# Patient Record
Sex: MALE | Race: WHITE
[De-identification: names, ages, dates, MRNs, and addresses within clinical notes are randomized per-mention and may not be internally consistent; named-entity substitution may affect disease eponyms.]

---

## 2017-07-29 ENCOUNTER — HOSPITAL ENCOUNTER (INPATIENT)
Dept: HOSPITAL 74 - JER | Age: 82
LOS: 3 days | Discharge: HOME | DRG: 720 | End: 2017-08-01
Attending: NURSE PRACTITIONER | Admitting: INTERNAL MEDICINE
Payer: COMMERCIAL

## 2017-07-29 VITALS — BODY MASS INDEX: 26.6 KG/M2

## 2017-07-29 DIAGNOSIS — A41.9: Primary | ICD-10-CM

## 2017-07-29 DIAGNOSIS — N40.1: ICD-10-CM

## 2017-07-29 DIAGNOSIS — E87.1: ICD-10-CM

## 2017-07-29 DIAGNOSIS — N39.0: ICD-10-CM

## 2017-07-29 DIAGNOSIS — R33.8: ICD-10-CM

## 2017-07-29 DIAGNOSIS — B96.89: ICD-10-CM

## 2017-07-29 DIAGNOSIS — N17.9: ICD-10-CM

## 2017-07-29 DIAGNOSIS — E11.9: ICD-10-CM

## 2017-07-29 DIAGNOSIS — I10: ICD-10-CM

## 2017-07-29 LAB
ALBUMIN SERPL-MCNC: 3.6 G/DL (ref 3.4–5)
ALP SERPL-CCNC: 76 U/L (ref 45–117)
ALT SERPL-CCNC: 35 U/L (ref 12–78)
ANION GAP SERPL CALC-SCNC: 13 MMOL/L (ref 8–16)
ANION GAP SERPL CALC-SCNC: 9 MMOL/L (ref 8–16)
APPEARANCE UR: (no result)
AST SERPL-CCNC: 31 U/L (ref 15–37)
BACTERIA #/AREA URNS HPF: (no result) /HPF
BILIRUB SERPL-MCNC: 0.8 MG/DL (ref 0.2–1)
BILIRUB UR STRIP.AUTO-MCNC: NEGATIVE MG/DL
CALCIUM SERPL-MCNC: 8.6 MG/DL (ref 8.5–10.1)
CALCIUM SERPL-MCNC: 8.9 MG/DL (ref 8.5–10.1)
CO2 SERPL-SCNC: 26 MMOL/L (ref 21–32)
CO2 SERPL-SCNC: 26 MMOL/L (ref 21–32)
COLOR UR: YELLOW
CREAT SERPL-MCNC: 1.1 MG/DL (ref 0.7–1.3)
CREAT SERPL-MCNC: 1.4 MG/DL (ref 0.7–1.3)
DEPRECATED RDW RBC AUTO: 13.4 % (ref 11.9–15.9)
GLUCOSE SERPL-MCNC: 154 MG/DL (ref 74–106)
GLUCOSE SERPL-MCNC: 192 MG/DL (ref 74–106)
KETONES UR QL STRIP: NEGATIVE
LEUKOCYTE ESTERASE UR QL STRIP.AUTO: (no result)
MCH RBC QN AUTO: 30 PG (ref 25.7–33.7)
MCHC RBC AUTO-ENTMCNC: 33.4 G/DL (ref 32–35.9)
MCV RBC: 89.7 FL (ref 80–96)
METAMYELOCYTES NFR BLD: 2 % (ref 0–2)
MUCOUS THREADS URNS QL MICRO: (no result)
NEUTROPHILS # BLD: 82 % (ref 42.8–82.8)
NITRITE UR QL STRIP: NEGATIVE
PH UR: 5 [PH] (ref 5–8)
PLATELET # BLD AUTO: 205 K/MM3 (ref 134–434)
PLATELET # BLD EST: ADEQUATE 10*3/UL
PMV BLD: 8.3 FL (ref 7.5–11.1)
PROT SERPL-MCNC: 7.4 G/DL (ref 6.4–8.2)
PROT UR QL STRIP: (no result)
PROT UR QL STRIP: NEGATIVE
RBC # BLD AUTO: 2 /HPF (ref 0–3)
RBC # UR STRIP: (no result) /UL
SP GR UR: 1.02 (ref 1–1.02)
UROBILINOGEN UR STRIP-MCNC: NEGATIVE MG/DL (ref 0.2–1)
WBC # BLD AUTO: 20.7 K/MM3 (ref 4–10)
WBC # UR AUTO: 60 /HPF (ref 3–5)

## 2017-07-29 RX ADMIN — INSULIN ASPART SCH: 100 INJECTION, SOLUTION INTRAVENOUS; SUBCUTANEOUS at 22:19

## 2017-07-29 NOTE — PDOC
History of Present Illness





- General


Chief Complaint: Urinary Problem


Stated Complaint: URINE RETENTION


Time Seen by Provider: 07/29/17 12:52


History Source: Patient, Family





- History of Present Illness


Timing/Duration: reports: constant





Past History





- Past Medical History


Allergies/Adverse Reactions: 


 Allergies











Allergy/AdvReac Type Severity Reaction Status Date / Time


 


No Known Drug Allergies Allergy   Verified 07/29/17 12:46











Home Medications: 


Ambulatory Orders





Metformin HCl [Glucophage] 500 mg PO DAILY 02/17/15 


Metoprolol Tartrate 50 mg PO DAILY 02/17/15 


Omeprazole [Prilosec] 40 mg PO DAILY 02/17/15 


Tamsulosin HCl 0.4 mg PO DAILY 02/17/15 








Anemia: No


Asthma: No


Cancer: No


Cardiac Disorders: No


CVA: No


COPD: No


CHF: No


Dementia: No


Diabetes: Yes


GI Disorders: Yes (REFLUX)


 Disorders: No


HTN: Yes


Hypercholesterolemia: No


Liver Disease: No


Seizures: No


Thyroid Disease: No





- Surgical History


Abdominal Surgery: No


Appendectomy: No


Cardiac Surgery: No


Cholecystectomy: No


Lung Surgery: No


Orthopedic Surgery: No





- Psycho/Social/Smoking Cessation Hx


Suicidal Ideation: No


Smoking History: Never smoked


Have you smoked in the past 12 months: No


Hx Alcohol Use: No


Drug/Substance Use Hx: No


Substance Use Type: None


Hx Substance Use Treatment: No





**Review of Systems





- Review of Systems


Constitutional: No: Chills, Fever


ABD/GI: No: Nausea, Vomiting, Abdominal cramping


: No: Dysuria, Flank Pain, Hematuria





*Physical Exam





- Vital Signs


 Last Vital Signs











Temp Pulse Resp BP Pulse Ox


 


 99.0 F   116 H  20   156/67   96 


 


 07/29/17 12:41  07/29/17 12:41  07/29/17 12:41  07/29/17 12:41  07/29/17 12:41














- Physical Exam


General Appearance: Yes: Appropriately Dressed.  No: Apparent Distress


HEENT: positive: Normal Voice


Neck: positive: Supple


Respiratory/Chest: negative: Respiratory Distress


Gastrointestinal/Abdominal: positive: Soft.  negative: Tender, Distended


Musculoskeletal: negative: CVA Tenderness


Extremity: positive: Normal Inspection


Integumentary: positive: Dry, Warm


Neurologic: positive: Fully Oriented, Alert, Normal Mood/Affect





ED Treatment Course





- LABORATORY


CBC & Chemistry Diagram: 


 07/29/17 11:50





 07/29/17 11:50





Medical Decision Making





- Medical Decision Making


07/29/17 12:58


81 yo M, h/o HTN, DM, BPH on flomax, BIB daughter for urinary retention.  As 

per patient, for the past 2 days he has been able to urinate but only a small 

amount comes out and finds himself urinating more frequently.  No hematuria, 

flank pain, nausea, vomiting, fever or chills





see exam





Urinary retention


Possibly 2/2 known h/o BPH, r/o uti


-pollack insertion


-labs


-ua.cx


-discuss dispo w/ pt's urologist





07/29/17 13:18








07/29/17 13:55


100 cc UO w/ pollack placement.  Leukocytosis of 20 with leuk esterase, blood and 

proteins on UA.  Urine culture sent.  Will treat for UTI with a dose of 

Rocephin (no prior sensitivity on record) in ED and discuss disposition with 

patient's PMD and urologist 








07/29/17 14:31








07/29/17 17:34


Dr Garcia contacted at 2:30 and at this time, have not heard back from MD. 

Hospitalist contacted and pt admitted.





07/29/17 17:43








*DC/Admit/Observation/Transfer


Diagnosis at time of Disposition: 


 Urinary retention





UTI (urinary tract infection)


Qualifiers:


 Urinary tract infection type: acute cystitis Hematuria presence: without 

hematuria Qualified Code(s): N30.00 - Acute cystitis without hematuria





- Discharge Dispostion


Condition at time of disposition: Good


Admit: Yes





- Referrals


Referrals: 


Lewis Logan MD [Primary Care Provider] -

## 2017-07-29 NOTE — HP
Admitting History and Physical





- Admission


Chief Complaint: urine retention, dysuria


History of Present Illness: 


83 yo [place]ralt[place] speaking M, h/o HTN, DM, BPH on flomax, BIB daughter for urinary 

retention. Pt reports dysuira x 3 days. he report bilateral flank pain. he 

reports subj fevers, chills, and rigors. he reports bladder pressure. he 

reports decreased po intake for 3 days. he denies n/v/d, cough, body aches, 

sore throat. he denies sob, chest pain, tingling, edema. he denies syncope. he 

reports following dr vicki roque (urology).





pmh/psh-  HTN, DM, BPH 


social- denies alcohol, tobacco. lives with family


famhx- mom- cancer(unk)





pcp- dr granger


urology- dr vicki roque





ros neg except for hpi





physical


gen- in nad, alert


hent- at/nc, nohemi, neck supple, trachea mid-line, no lymphadenopathy


resp- no cough, lung ctab, no ronchi, no wheeze, no cyanosis


cards- s1s2 heard, tachy, normal rhythm, extremity pulses palpable, no leg edema


gi- soft non-tender, no guarding, no distention, mild ttp over bladder, no 

rigidity


- pollack in place, scant garry urine in bag, no cvat


skin-irregularly thickened lesion to lateral right buttocks with mild redness 

at base, no rashes 


psych- cooperative, no agitation


neuro- alert oriented, no facial droop, speech clear, cn2-12 grossly intact. 

intentional hand tremors


musk- no back pain, moving all ext spontaneously





prob list


sirs


dm


bph


htn


hyponatremia


uti


elevated cr


?urine retention





a/p- 83 yo [place] speaking M, h/o HTN, DM, BPH on flomax, BIB daughter for 

urinary retention found to have uti and admitted for eval of their emergent 

condition





1. uti, sirs


follow up cultures


ivf hydration


cont abx (ctx)


monitor labs





2. dm


ssi


f/s


diabetic diet


hold metformin 2/2 elevated cr





3. ?jignesh possibly related to ?sepsis, ?obstructive uropathy


fu renal us


no prior labs for comparison


follow up urine lytes


monitor labs





4. bph, ?urinary retention


fu urology consult


cont flomax


maintain pollack





5. hyponatremia probably volume depletion +/- hyperglycemia


pt reports decreased po intake x 3 days


does no look overloaded on exam


fu bmp





6. htn


cont home meds








dvt prophy


scd, oob, hep sq





fen


gentle fluids, diabetic diet





dispo- requires >2mn stay for uti, sepsis


History Source: Patient, Family Member


Limitations to Obtaining History: Language Barrier





- Smoking History


Smoking history: Never smoked


Have you smoked in the past 12 months: No





- Alcohol/Substance Use


Hx Alcohol Use: No





Home Medications





- Allergies


Allergies/Adverse Reactions: 


 Allergies











Allergy/AdvReac Type Severity Reaction Status Date / Time


 


No Known Drug Allergies Allergy   Verified 07/29/17 12:46














- Home Medications


Home Medications: 


Ambulatory Orders





Metformin HCl [Glucophage] 500 mg PO DAILY 02/17/15 


Metoprolol Tartrate 50 mg PO DAILY 02/17/15 


Omeprazole [Prilosec] 40 mg PO DAILY 02/17/15 


Tamsulosin HCl 0.4 mg PO DAILY 02/17/15 











Physical Examination


Vital Signs: 


 Vital Signs











Temperature  98 F   07/29/17 16:59


 


Pulse Rate  98 H  07/29/17 16:59


 


Respiratory Rate  18   07/29/17 16:59


 


Blood Pressure  140/68   07/29/17 16:59


 


O2 Sat by Pulse Oximetry (%)  96   07/29/17 16:59














Visit type





- Emergency Visit


Emergency Visit: Yes


ED Registration Date: 07/29/17


Care time: The patient presented to the Emergency Department on the above date 

and was hospitalized for further evaluation of their emergent condition.





- New Patient


This patient is new to me today: Yes


Date on this admission: 07/30/17





- Critical Care


Critical Care patient: No

## 2017-07-30 LAB
ANION GAP SERPL CALC-SCNC: 10 MMOL/L (ref 8–16)
BASOPHILS # BLD: 0.3 % (ref 0–2)
CALCIUM SERPL-MCNC: 8.2 MG/DL (ref 8.5–10.1)
CO2 SERPL-SCNC: 26 MMOL/L (ref 21–32)
CREAT SERPL-MCNC: 1 MG/DL (ref 0.7–1.3)
CREAT UR-MCNC: 117 MG/DL (ref 20–370)
DEPRECATED RDW RBC AUTO: 13.7 % (ref 11.9–15.9)
EOSINOPHIL # BLD: 0.1 % (ref 0–4.5)
GLUCOSE SERPL-MCNC: 104 MG/DL (ref 74–106)
MAGNESIUM SERPL-MCNC: 2 MG/DL (ref 1.8–2.4)
MCH RBC QN AUTO: 30.3 PG (ref 25.7–33.7)
MCHC RBC AUTO-ENTMCNC: 33.9 G/DL (ref 32–35.9)
MCV RBC: 89.4 FL (ref 80–96)
NEUTROPHILS # BLD: 78 % (ref 42.8–82.8)
PHOSPHATE SERPL-MCNC: 2.3 MG/DL (ref 2.5–4.9)
PLATELET # BLD AUTO: 184 K/MM3 (ref 134–434)
PMV BLD: 9 FL (ref 7.5–11.1)
WBC # BLD AUTO: 17.9 K/MM3 (ref 4–10)

## 2017-07-30 RX ADMIN — METOPROLOL SUCCINATE SCH MG: 50 TABLET, EXTENDED RELEASE ORAL at 10:33

## 2017-07-30 RX ADMIN — INSULIN ASPART SCH: 100 INJECTION, SOLUTION INTRAVENOUS; SUBCUTANEOUS at 11:53

## 2017-07-30 RX ADMIN — INSULIN ASPART SCH: 100 INJECTION, SOLUTION INTRAVENOUS; SUBCUTANEOUS at 17:56

## 2017-07-30 RX ADMIN — INSULIN ASPART SCH: 100 INJECTION, SOLUTION INTRAVENOUS; SUBCUTANEOUS at 21:47

## 2017-07-30 RX ADMIN — CEFTRIAXONE SCH MLS/HR: 1 INJECTION, POWDER, FOR SOLUTION INTRAMUSCULAR; INTRAVENOUS at 10:33

## 2017-07-30 RX ADMIN — HEPARIN SODIUM SCH UNIT: 5000 INJECTION, SOLUTION INTRAVENOUS; SUBCUTANEOUS at 10:33

## 2017-07-30 RX ADMIN — HEPARIN SODIUM SCH UNIT: 5000 INJECTION, SOLUTION INTRAVENOUS; SUBCUTANEOUS at 21:47

## 2017-07-30 RX ADMIN — TAMSULOSIN HYDROCHLORIDE SCH MG: 0.4 CAPSULE ORAL at 10:33

## 2017-07-30 RX ADMIN — INSULIN ASPART SCH: 100 INJECTION, SOLUTION INTRAVENOUS; SUBCUTANEOUS at 06:54

## 2017-07-30 RX ADMIN — PANTOPRAZOLE SODIUM SCH MG: 40 TABLET, DELAYED RELEASE ORAL at 10:33

## 2017-07-30 NOTE — PN
Physical Exam: 


SUBJECTIVE: Patient seen and examined at the bedside.  


Denies any pain, shortness of breath or suprapubic discomfort.





OBJECTIVE:


Lactic acid 1.1


WBC trending down to 18.9


Remains afebrile


Urine culture pending/blood cultures pending


UA: +1 protein, +2 blood, +2 leuk est.





 Vital Signs











 Period  Temp  Pulse  Resp  BP Sys/Castellano  Pulse Ox


 


 Last 24 Hr  97.7 F-97.8 F  92-98  20-20  125-126/62-63  96








GENERAL: The patient is awake, alert, and fully oriented, in no acute distress.


HEAD: Normal with no signs of trauma.


EYES: PERRL, extraocular movements intact, sclera anicteric, conjunctiva clear. 

No ptosis. 


ENT: Ears normal, nares patent, oropharynx clear without exudates, moist mucous 

membranes.


NECK: Trachea midline, full range of motion, supple. 


LUNGS:  Scattered expiratory wheezing, patient denies shortness of breath - IVF 

stopped


HEART: Regular rate and rhythm, S1, S2 without murmur


ABDOMEN: Soft, nontender, nondistended, normoactive bowel sounds, no guarding, 

no rebound, no hepatosplenomegaly, no masses.


EXTREMITIES: 2+ pulses, warm, well-perfused, no edema. 


NEUROLOGICAL:Normal speech, gait not observed.


PSYCH: Normal mood, normal affect.


SKIN: Warm, dry, normal turgor, no rashes or lesions noted


 Laboratory Results - last 24 hr











  07/29/17 07/29/17 07/29/17





  20:20 21:49 22:18


 


Sodium   132 L 


 


Potassium   4.0 


 


Chloride   97 L 


 


Carbon Dioxide   26 


 


Anion Gap   9 


 


BUN   18  D 


 


Creatinine   1.1  D 


 


POC Glucometer    152


 


Random Glucose   154 H 


 


Lactic Acid  1.1  


 


Calcium   8.6 


 


Phosphorus   


 


Magnesium   














  07/30/17 07/30/17





  06:20 06:53


 


Sodium  135 L 


 


Potassium  3.9 


 


Chloride  99 


 


Carbon Dioxide  26 


 


Anion Gap  10 


 


BUN  18 


 


Creatinine  1.0 


 


POC Glucometer   112


 


Random Glucose  104  D 


 


Lactic Acid  


 


Calcium  8.2 L 


 


Phosphorus  2.3 L 


 


Magnesium  2.0 








Active Medications











Generic Name Dose Route Start Last Admin





  Trade Name Freq  PRN Reason Stop Dose Admin


 


Acetaminophen  650 mg 07/29/17 20:03  





  Tylenol -  PO   





  Q4H PRN   





  FEVER OR PAIN   


 


Heparin Sodium (Porcine)  5,000 unit 07/30/17 10:00  





  Heparin -  SQ   





  BID VALENTINO   


 


Ceftriaxone Sodium  50 mls @ 100 mls/hr 07/30/17 10:00  





  Rocephin 1gm Ivpb (Pre-Docked)  IVPB   





  DAILY [facility]   


 


Sodium Chloride  1,000 mls @ 75 mls/hr 07/29/17 20:15 07/29/17 22:17





  Normal Saline -  IV   75 mls/hr





  ASDIR VALENTINO   Administration


 


Insulin Aspart  1 vial 07/29/17 22:00 07/30/17 06:54





  Novolog Vial Sliding Scale -  SQ   Not Given





  [facility]   





  Protocol   


 


Metoprolol Succinate  50 mg 07/30/17 10:00  





  Toprol Xl -  PO   





  DAILY VALENTINO   


 


Ondansetron HCl  4 mg 07/29/17 20:03  





  Zofran Injection  IVPB   





  Q6H PRN   





  NAUSEA   


 


Pantoprazole Sodium  40 mg 07/30/17 10:00  





  Protonix -  PO   





  DAILY VALENTINO   


 


Tamsulosin HCl  0.4 mg 07/30/17 10:00  





  Flomax -  PO   





  DAILY [facility]INO   











ASSESSMENT/PLAN:





Patient is a 82 year old male with a significant past medical history of 

hypertension, diabetes mellitus, and BPH.  He presented to the ED on 7/29/2017 

for urinary retention and was found to have UTI.  On admission his lactic acid 

remained normal at 1.1, but WBC @ 20.7 and he was tachycardic at 116.





Imaging:


Chest Xray 7/30/17: no acute pathology, clear lungs, normal sage, normal heart


Renal ultrasound 7/29/2017: negative exam





:


UTI/Sepsis:


A/P:  Elevated wbc on admission with tachycardia


WBC trending down, now with mild tachycardia in 90s


UA +protein, 2+ blood, cloudy urine, 2+leuk est.


On Ceftriaxone (7/29- )


UC pending, blood cultures pending


On Flomax


Urology consult





LIBERTY - resolved


Creatinine 1.4>1.1, d/c ivf





BPH


A/P:  On Flomax


[place]on catheter





Endocrine:


Diabetes Mellitus


A/P:  Diabetic diet, monitor BGMs


Novolog sliding scale





Cardiology:


Hypertension


A/P: On Metoprolol, monitor BP





F.E.N.


Fluids: tolerating PO, d/c fluids secondary to scattered wheezing


Electrolytes: , monitor





Prophylaxis:


DVT: Heparin 


Gi: Protonix








Disposition:  Requires inpatient hospitalization for acute condition.  Full 

code.

## 2017-07-30 NOTE — EKG
Test Reason : 

Blood Pressure : ***/*** mmHG

Vent. Rate : 097 BPM     Atrial Rate : 097 BPM

   P-R Int : 180 ms          QRS Dur : 086 ms

    QT Int : 334 ms       P-R-T Axes : 064 024 039 degrees

   QTc Int : 424 ms

 

NORMAL SINUS RHYTHM

POSSIBLE LEFT ATRIAL ENLARGEMENT

NONSPECIFIC ST ABNORMALITY

BORDERLINE ECG NO PREVIOUS ECGS AVAILABLE

Confirmed by BANDAR CARRENO MD ([id number]) on 7/30/2017 5:46:56 PM

 

Referred By:             Confirmed By:BANDAR CARRENO MD

## 2017-07-31 LAB
ALBUMIN SERPL-MCNC: 3.1 G/DL (ref 3.4–5)
ALP SERPL-CCNC: 90 U/L (ref 45–117)
ALT SERPL-CCNC: 40 U/L (ref 12–78)
ANION GAP SERPL CALC-SCNC: 10 MMOL/L (ref 8–16)
AST SERPL-CCNC: 30 U/L (ref 15–37)
BASOPHILS # BLD: 0.2 % (ref 0–2)
BILIRUB SERPL-MCNC: 0.8 MG/DL (ref 0.2–1)
CALCIUM SERPL-MCNC: 8.4 MG/DL (ref 8.5–10.1)
CO2 SERPL-SCNC: 26 MMOL/L (ref 21–32)
CREAT SERPL-MCNC: 0.9 MG/DL (ref 0.7–1.3)
DEPRECATED RDW RBC AUTO: 13.8 % (ref 11.9–15.9)
EOSINOPHIL # BLD: 1 % (ref 0–4.5)
GLUCOSE SERPL-MCNC: 126 MG/DL (ref 74–106)
MCH RBC QN AUTO: 29.8 PG (ref 25.7–33.7)
MCHC RBC AUTO-ENTMCNC: 33.6 G/DL (ref 32–35.9)
MCV RBC: 88.9 FL (ref 80–96)
NEUTROPHILS # BLD: 76.1 % (ref 42.8–82.8)
PLATELET # BLD AUTO: 217 K/MM3 (ref 134–434)
PMV BLD: 9 FL (ref 7.5–11.1)
PROT SERPL-MCNC: 6.6 G/DL (ref 6.4–8.2)
WBC # BLD AUTO: 15.4 K/MM3 (ref 4–10)

## 2017-07-31 RX ADMIN — HEPARIN SODIUM SCH UNIT: 5000 INJECTION, SOLUTION INTRAVENOUS; SUBCUTANEOUS at 10:02

## 2017-07-31 RX ADMIN — INSULIN ASPART SCH UNITS: 100 INJECTION, SOLUTION INTRAVENOUS; SUBCUTANEOUS at 11:42

## 2017-07-31 RX ADMIN — PANTOPRAZOLE SODIUM SCH MG: 40 TABLET, DELAYED RELEASE ORAL at 10:02

## 2017-07-31 RX ADMIN — METOPROLOL SUCCINATE SCH MG: 50 TABLET, EXTENDED RELEASE ORAL at 10:02

## 2017-07-31 RX ADMIN — CEFTRIAXONE SCH MLS/HR: 1 INJECTION, POWDER, FOR SOLUTION INTRAMUSCULAR; INTRAVENOUS at 10:03

## 2017-07-31 RX ADMIN — SULFAMETHOXAZOLE AND TRIMETHOPRIM SCH EACH: 800; 160 TABLET ORAL at 21:12

## 2017-07-31 RX ADMIN — INSULIN ASPART SCH: 100 INJECTION, SOLUTION INTRAVENOUS; SUBCUTANEOUS at 17:56

## 2017-07-31 RX ADMIN — INSULIN ASPART SCH: 100 INJECTION, SOLUTION INTRAVENOUS; SUBCUTANEOUS at 21:11

## 2017-07-31 RX ADMIN — HEPARIN SODIUM SCH UNIT: 5000 INJECTION, SOLUTION INTRAVENOUS; SUBCUTANEOUS at 21:11

## 2017-07-31 RX ADMIN — TAMSULOSIN HYDROCHLORIDE SCH MG: 0.4 CAPSULE ORAL at 10:03

## 2017-07-31 RX ADMIN — INSULIN ASPART SCH: 100 INJECTION, SOLUTION INTRAVENOUS; SUBCUTANEOUS at 06:57

## 2017-07-31 NOTE — CONSULT
Consult - text type





- Consultation


Consultation Note: 


CC: uti with retention





HPI:  Patient is an 82 year old male with history of DM, htn, and bph. Patient 

has been well controlled on flomax. Patient developed fevers, chill, with 

difficulty emptying his bladder.  The patient denies gross hematuria.  He did 

complain of bilateral flank pain without nausea or vomiting.  





PE


abd-bladder not palpable; no CVAT





genitalia-nl phallus/testes





rectal 2-3 + prostate with asymmetry; no fluctuance





renal sonogram and labs reviewed





imp


uti


bph


urinary retention





plan


continue antibiotics


continue flomax


d/c pollack and give prolonged trial of voiding





discussed with family x 25 minutes

## 2017-07-31 NOTE — PN
Physical Exam: 


SUBJECTIVE: Patient seen and examined.  States he feels well, denies any chest 

or shortness of breath.





OBJECTIVE:


Hyponatremia @134 on elderly patient  - NS 1 liters @100cc x 1 with Repeat 

sodium in a.m. 


pollack with clear yellow urine, d/c pollack catheter, start voiding trial - 

monitor intake and output


Started on Bactrim Ds (Sulfamethoxzole/Trimethoprim) tonight will need an 

additional 7 days on d/c





 Vital Signs











 Period  Temp  Pulse  Resp  BP Sys/Castellano  Pulse Ox


 


 Last 24 Hr  97.7 F-98.6 F    19-20  118-152/60-77  96-99








GENERAL: The patient is awake, alert, and fully oriented, in no acute distress.


HEAD: Normal with no signs of trauma.


EYES: PERRL, extraocular movements intact, sclera anicteric, conjunctiva clear. 

No ptosis. 


ENT: Ears normal, nares patent, oropharynx clear without exudates, moist mucous 

membranes.


NECK: Trachea midline, full range of motion, supple. 


LUNGS:  clear to auscultation bilaterally, no accessory muscle use.


HEART: Regular rate and rhythm, S1, S2 without murmur


ABDOMEN: Soft, nontender, nondistended, normoactive bowel sounds, no guarding, 

no rebound, no hepatosplenomegaly, no masses.


EXTREMITIES: 2+ pulses, warm, well-perfused, no edema. 


NEUROLOGICAL:Normal speech, gait not observed.


PSYCH: Normal mood, normal affect.


SKIN: Warm, dry, normal turgor, no rashes or lesions noted





 Laboratory Results - last 24 hr











  07/30/17 07/30/17 07/30/17





  06:20 08:50 08:59


 


WBC  17.9 H  


 


RBC  4.14  


 


Hgb  12.6  


 


Hct  37.0  


 


MCV  89.4  


 


MCH  30.3  


 


MCHC  33.9  


 


RDW  13.7  


 


Plt Count  184  


 


MPV  9.0  


 


Neutrophils %  78.0  


 


Lymphocytes %  8.9  


 


Monocytes %  12.7 H D  


 


Eosinophils %  0.1  


 


Basophils %  0.3  


 


Sodium   


 


Potassium   


 


Chloride   


 


Carbon Dioxide   


 


Anion Gap   


 


BUN   


 


Creatinine   


 


Creat Clearance w eGFR   


 


POC Glucometer   


 


Random Glucose   


 


Calcium   


 


Total Bilirubin   


 


AST   


 


ALT   


 


Alkaline Phosphatase   


 


Total Protein   


 


Albumin   


 


Urine Creatinine   Cancelled  117.0














  07/30/17 07/30/17 07/30/17





  11:53 17:55 21:43


 


WBC   


 


RBC   


 


Hgb   


 


Hct   


 


MCV   


 


MCH   


 


MCHC   


 


RDW   


 


Plt Count   


 


MPV   


 


Neutrophils %   


 


Lymphocytes %   


 


Monocytes %   


 


Eosinophils %   


 


Basophils %   


 


Sodium   


 


Potassium   


 


Chloride   


 


Carbon Dioxide   


 


Anion Gap   


 


BUN   


 


Creatinine   


 


Creat Clearance w eGFR   


 


POC Glucometer  143  137  134


 


Random Glucose   


 


Calcium   


 


Total Bilirubin   


 


AST   


 


ALT   


 


Alkaline Phosphatase   


 


Total Protein   


 


Albumin   


 


Urine Creatinine   














  07/31/17 07/31/17 07/31/17





  06:56 07:00 07:00


 


WBC   15.4 H 


 


RBC   4.38 


 


Hgb   13.1 


 


Hct   39.0 


 


MCV   88.9 


 


MCH   29.8 


 


MCHC   33.6 


 


RDW   13.8 


 


Plt Count   217 


 


MPV   9.0 


 


Neutrophils %   76.1 


 


Lymphocytes %   11.8  D 


 


Monocytes %   10.9 H 


 


Eosinophils %   1.0  D 


 


Basophils %   0.2 


 


Sodium    134 L


 


Potassium    4.1


 


Chloride    98


 


Carbon Dioxide    26


 


Anion Gap    10


 


BUN    18


 


Creatinine    0.9


 


Creat Clearance w eGFR    > 60


 


POC Glucometer  128  


 


Random Glucose    126 H D


 


Calcium    8.4 L


 


Total Bilirubin    0.8


 


AST    30


 


ALT    40


 


Alkaline Phosphatase    90


 


Total Protein    6.6


 


Albumin    3.1 L


 


Urine Creatinine   








Active Medications











Generic Name Dose Route Start Last Admin





  Trade Name Freq  PRN Reason Stop Dose Admin


 


Acetaminophen  650 mg 07/29/17 20:03  





  Tylenol -  PO   





  Q4H PRN   





  FEVER OR PAIN   


 


Heparin Sodium (Porcine)  5,000 unit 07/30/17 10:00 07/30/17 21:47





  Heparin -  SQ   5,000 unit





  BID VALENTINO   Administration


 


Ceftriaxone Sodium 1 gm/  50 mls @ 100 mls/hr 07/30/17 10:30 07/30/17 10:33





  Dextrose  IVPB   100 mls/hr





  DAILY VALENTINO   Administration


 


Sodium Chloride  1,000 mls @ 100 mls/hr 07/31/17 09:00  





  Normal Saline -  IV 08/01/17 08:58  





  ASDIR VALENTINO   


 


Insulin Aspart  1 vial 07/29/17 22:00 07/31/17 06:57





  Novolog Vial Sliding Scale -  SQ   Not Given





  [facility]   





  Protocol   


 


Metoprolol Succinate  50 mg 07/30/17 10:00 07/30/17 10:33





  Toprol Xl -  PO   50 mg





  DAILY AVLENTINO   Administration


 


Ondansetron HCl  4 mg 07/29/17 20:03  





  Zofran Injection  IVPB   





  Q6H PRN   





  NAUSEA   


 


Pantoprazole Sodium  40 mg 07/30/17 10:00 07/30/17 10:33





  Protonix -  PO   40 mg





  DAILY VALENTINO   Administration


 


Tamsulosin HCl  0.4 mg 07/30/17 10:00 07/30/17 10:33





  Flomax -  PO   0.4 mg





  DAILY VALENTINO   Administration











ASSESSMENT/PLAN:





Patient is a 82 year old male with a significant past medical history of 

hypertension, diabetes mellitus, and BPH.  He presented to the ED on 7/29/2017 

for urinary retention and was found to have UTI.  On admission his lactic acid 

remained normal at 1.1, but WBC @ 20.7 and he was tachycardic at 116.





Imaging:


Chest Xray 7/30/17: no acute pathology, clear lungs, normal sage, normal heart


Renal ultrasound 7/29/2017: negative exam





:


UTI/Sepsis - improving


A/P:  Elevated wbc on admission with tachycardia


WBC trending down, tachycardia now resolved


UA +protein, 2+ blood, cloudy urine, 2+leuk est, Urine culture + Citrobacter 

Koseri - sensitive to Bactrim Ds, to started PO tonight and to continue for 7 

days on d/c,  Received 3 doses of Ceftriaxone 1 gram x 3 doses


Discontinue pollack catheter and start voiding trial, monitor intake and output - 

bladder scan q shift





LIBERTY - resolved


Creatinine 1.4>0.1





Electrolytes Imbalance: 


Hyponatremia on elderly male 


, treat with 1 liter of NS, recheck CMP in a.m.





BPH - chronic


A/P:  On Flomax


d/c Pollack catheter





Endocrine:


Diabetes Mellitus - chronic


A/P:  Diabetic diet, monitor BGMs


Novolog sliding scale





Cardiology:


Hypertension


A/P: On Metoprolol, monitor BP





F.E.N.


Fluids: tolerating PO,


Electrolytes: 


Hyponatremia on elderly male 


, treat with 1 liter of NS, recheck CMP in a.m.





Prophylaxis:


DVT: Heparin 


Gi: Protonix





Disposition:  Anticipate discharge in a.m. pending voiding trial and 

electrolyte correction. Follow up with urology and PCP as outpatient.





Visit type





- Emergency Visit


Emergency Visit: Yes


ED Registration Date: 07/29/17


Care time: The patient presented to the Emergency Department on the above date 

and was hospitalized for further evaluation of their emergent condition.





- New Patient


This patient is new to me today: No





- Critical Care


Critical Care patient: No





- Discharge Referral


Referred to [facility] No

## 2017-08-01 VITALS — DIASTOLIC BLOOD PRESSURE: 79 MMHG | TEMPERATURE: 98.5 F | SYSTOLIC BLOOD PRESSURE: 144 MMHG | HEART RATE: 83 BPM

## 2017-08-01 LAB
ALBUMIN SERPL-MCNC: 2.9 G/DL (ref 3.4–5)
ALP SERPL-CCNC: 107 U/L (ref 45–117)
ALT SERPL-CCNC: 61 U/L (ref 12–78)
ANION GAP SERPL CALC-SCNC: 7 MMOL/L (ref 8–16)
AST SERPL-CCNC: 43 U/L (ref 15–37)
BASOPHILS # BLD: 0 % (ref 0–2)
BILIRUB SERPL-MCNC: 0.4 MG/DL (ref 0.2–1)
CALCIUM SERPL-MCNC: 8.6 MG/DL (ref 8.5–10.1)
CO2 SERPL-SCNC: 26 MMOL/L (ref 21–32)
CREAT SERPL-MCNC: 0.9 MG/DL (ref 0.7–1.3)
DEPRECATED RDW RBC AUTO: 13.9 % (ref 11.9–15.9)
EOSINOPHIL # BLD: 1 % (ref 0–4.5)
GLUCOSE SERPL-MCNC: 126 MG/DL (ref 74–106)
MCH RBC QN AUTO: 30 PG (ref 25.7–33.7)
MCHC RBC AUTO-ENTMCNC: 33.5 G/DL (ref 32–35.9)
MCV RBC: 89.5 FL (ref 80–96)
NEUTROPHILS # BLD: 72 % (ref 42.8–82.8)
PLATELET # BLD AUTO: 238 K/MM3 (ref 134–434)
PLATELET # BLD EST: ADEQUATE 10*3/UL
PMV BLD: 8.8 FL (ref 7.5–11.1)
PROT SERPL-MCNC: 6.5 G/DL (ref 6.4–8.2)
WBC # BLD AUTO: 11.9 K/MM3 (ref 4–10)

## 2017-08-01 RX ADMIN — TAMSULOSIN HYDROCHLORIDE SCH MG: 0.4 CAPSULE ORAL at 09:48

## 2017-08-01 RX ADMIN — METOPROLOL SUCCINATE SCH MG: 50 TABLET, EXTENDED RELEASE ORAL at 09:48

## 2017-08-01 RX ADMIN — HEPARIN SODIUM SCH UNIT: 5000 INJECTION, SOLUTION INTRAVENOUS; SUBCUTANEOUS at 09:48

## 2017-08-01 RX ADMIN — PANTOPRAZOLE SODIUM SCH MG: 40 TABLET, DELAYED RELEASE ORAL at 09:48

## 2017-08-01 RX ADMIN — SULFAMETHOXAZOLE AND TRIMETHOPRIM SCH EACH: 800; 160 TABLET ORAL at 09:47

## 2017-08-01 RX ADMIN — INSULIN ASPART SCH: 100 INJECTION, SOLUTION INTRAVENOUS; SUBCUTANEOUS at 12:23

## 2017-08-01 RX ADMIN — INSULIN ASPART SCH: 100 INJECTION, SOLUTION INTRAVENOUS; SUBCUTANEOUS at 06:16

## 2017-08-01 NOTE — DS
Physical Exam: 


SUBJECTIVE: Patient seen and examined sitting on edge of bed. Voices no 

complaints, feels well.





OBJECTIVE:





 Vital Signs











 Period  Temp  Pulse  Resp  BP Sys/Castellano  Pulse Ox


 


 Last 24 Hr  97.7 F-99.3 F  75-86  16-20  112-152/60-79  97








PHYSICAL EXAM





GENERAL: The patient is awake, alert, and fully oriented, in no acute distress.


HEAD: Normal with no signs of trauma.


EYES: PERRL, extraocular movements intact, sclera anicteric, conjunctiva clear. 


HEART: Regular rate and rhythm, S1, S2 without murmur, rub or gallop.


ABDOMEN: Soft, nontender, nondistended, normoactive bowel sounds, no guarding, 

no rebound


EXTREMITIES: 2+ pulses, warm, well-perfused, no edema. 


NEUROLOGICAL: Cranial nerves II through XII grossly intact. Normal speech, gait 

not observed.








LABS


 Laboratory Results - last 24 hr











  07/31/17 07/31/17 07/31/17





  11:33 17:12 21:09


 


WBC   


 


RBC   


 


Hgb   


 


Hct   


 


MCV   


 


MCH   


 


MCHC   


 


RDW   


 


Plt Count   


 


MPV   


 


Neutrophils %   


 


Lymphocytes %   


 


Monocytes %   


 


Eosinophils %   


 


Basophils %   


 


Band Neutrophils   


 


Myelocytes   


 


Differential Comment   


 


Platelet Estimate   


 


Sodium   


 


Potassium   


 


Chloride   


 


Carbon Dioxide   


 


Anion Gap   


 


BUN   


 


Creatinine   


 


Creat Clearance w eGFR   


 


POC Glucometer  237  136  188


 


Random Glucose   


 


Calcium   


 


Total Bilirubin   


 


AST   


 


ALT   


 


Alkaline Phosphatase   


 


Total Protein   


 


Albumin   














  08/01/17 08/01/17 08/01/17





  06:14 07:00 07:30


 


WBC    11.9 H


 


RBC    4.24


 


Hgb    12.7


 


Hct    38.0


 


MCV    89.5


 


MCH    30.0


 


MCHC    33.5


 


RDW    13.9


 


Plt Count    238


 


MPV    8.8


 


Neutrophils %    72.0


 


Lymphocytes %    17.0  D


 


Monocytes %    7.0


 


Eosinophils %    1.0


 


Basophils %    0.0


 


Band Neutrophils    2.0  D


 


Myelocytes    1


 


Differential Comment    Manual diff done


 


Platelet Estimate    Adequate


 


Sodium   133 L 


 


Potassium   4.2 


 


Chloride   100 


 


Carbon Dioxide   26 


 


Anion Gap   7 L 


 


BUN   14  D 


 


Creatinine   0.9 


 


Creat Clearance w eGFR   > 60 


 


POC Glucometer  128  


 


Random Glucose   126 H 


 


Calcium   8.6 


 


Total Bilirubin   0.4  D 


 


AST   43 H D 


 


ALT   61  D 


 


Alkaline Phosphatase   107 


 


Total Protein   6.5 


 


Albumin   2.9 L 











HOSPITAL COURSE:





Date of Admission:07/29/17





Date of Discharge: 08/01/17





Pre hospital course


82-year old male with a significant PMH of HTN, NIDDM, and BPH.  He presented 

to the ED on 7/29/2017 for urinary retention and was found to have UTI.  On 

admission WBC 20.7k, tachycardic to 116.





Imaging


Chest Xray 7/30/17: no acute pathology, clear lungs, normal sage, normal heart


Renal ultrasound 7/29/2017: negative exam





Hospital course


Sepsis secondary to Citrobacter UTI


   --remained afebrile


   --WBC trended down to 11.9k


   --received ceftriaxone x 3 doses; culture sensitive to Bactrim, started and 

discharged with prescription to complete 7 days of treatment





Urinary retention, resolved


   --voiding freely after pollack removal





BPH


   --continued Flomax





LIBERTY, resolved


   --Cr 1.4 on admission, 0.9 at time of discharge 





Hyponatremia


   --Na 129 on admission, improved to 135, 133 at time of discharge


   


NIDDM


   --Novolog sliding scale coverage





Hypertension


   --continued Toprol XL











Minutes to complete discharge: 35





Discharge Summary


Reason For Visit: URINARY TRACT INFECTION


Current Active Problems





UTI (urinary tract infection) (Acute) 


Urinary retention (Acute) 








Condition: Improved





- Instructions


Diet, Activity, Other Instructions: 


Mr. Black:





You were admitted to [facility] on 7/29/2017 with a urinary tract infection and 

treated with Ceftriaxone IV antibiotics.  You were started on Bactrim Ds on 7/31 /2017 for 7 more days (7/31 to August 7).  Take this medication as ordered, you 

make take it with food.    We have also ordered an medication called 

ACIDOPHILLUS that will help you not have any abdominal discomfort or diarrhea 

when you are on the antibiotics.





Please see your primary care doctor within 1 week after discharge so that he 

can check your labs.  





Please return to the ER with any new or persistent symptoms. 





Please call us with any questions that you may have.





[facility]


[phone]








Referrals: 


Lewis Logan MD [Primary Care Provider] - 


Disposition: HOME





- Home Medications


Comprehensive Discharge Medication List: 


Ambulatory Orders





Metformin HCl [Glucophage] 500 mg PO DAILY 02/17/15 


Metoprolol Tartrate 50 mg PO DAILY 02/17/15 


Omeprazole [Prilosec] 40 mg PO DAILY 02/17/15 


Tamsulosin HCl 0.4 mg PO DAILY 02/17/15 


Lactobacillus Acidophilus [Acidophilus] 100 mg PO DAILY #7 capsule 07/31/17 


Metoprolol Succinate [Toprol XL -] 50 mg PO DAILY  tab 07/31/17 


Sulfamethoxazole/Trimethoprim [Bactrim DS -] 1 each PO BID #14 tablet 07/31/17 








This patient is new to me today: Yes


Date on this admission: 08/03/17


Emergency Visit: Yes


ED Registration Date: 07/29/17


Care time: The patient presented to the Emergency Department on the above date 

and was hospitalized for further evaluation of their emergent condition.


Critical Care patient: No





- Discharge Referral


Referred to [facility] No

## 2019-02-28 ENCOUNTER — HOSPITAL ENCOUNTER (EMERGENCY)
Dept: HOSPITAL 74 - JER | Age: 84
Discharge: HOME | End: 2019-02-28
Payer: COMMERCIAL

## 2019-02-28 VITALS — HEART RATE: 75 BPM | DIASTOLIC BLOOD PRESSURE: 83 MMHG | SYSTOLIC BLOOD PRESSURE: 167 MMHG

## 2019-02-28 VITALS — TEMPERATURE: 98.1 F

## 2019-02-28 VITALS — BODY MASS INDEX: 27.6 KG/M2

## 2019-02-28 DIAGNOSIS — K21.9: ICD-10-CM

## 2019-02-28 DIAGNOSIS — N40.0: ICD-10-CM

## 2019-02-28 DIAGNOSIS — E11.9: ICD-10-CM

## 2019-02-28 DIAGNOSIS — I10: ICD-10-CM

## 2019-02-28 DIAGNOSIS — K59.00: ICD-10-CM

## 2019-02-28 DIAGNOSIS — R07.89: Primary | ICD-10-CM

## 2019-02-28 LAB
ALBUMIN SERPL-MCNC: 3.8 G/DL (ref 3.4–5)
ALP SERPL-CCNC: 81 U/L (ref 45–117)
ALT SERPL-CCNC: 36 U/L (ref 13–61)
ANION GAP SERPL CALC-SCNC: 6 MMOL/L (ref 8–16)
APPEARANCE UR: CLEAR
AST SERPL-CCNC: 21 U/L (ref 15–37)
BASOPHILS # BLD: 0.4 % (ref 0–2)
BILIRUB SERPL-MCNC: 0.3 MG/DL (ref 0.2–1)
BILIRUB UR STRIP.AUTO-MCNC: NEGATIVE MG/DL
BNP SERPL-MCNC: 99.9 PG/ML (ref 5–450)
BUN SERPL-MCNC: 16 MG/DL (ref 7–18)
CALCIUM SERPL-MCNC: 8.8 MG/DL (ref 8.5–10.1)
CHLORIDE SERPL-SCNC: 99 MMOL/L (ref 98–107)
CO2 SERPL-SCNC: 28 MMOL/L (ref 21–32)
COLOR UR: (no result)
CREAT SERPL-MCNC: 1.2 MG/DL (ref 0.55–1.3)
DEPRECATED RDW RBC AUTO: 13.5 % (ref 11.9–15.9)
EOSINOPHIL # BLD: 0.4 % (ref 0–4.5)
GLUCOSE SERPL-MCNC: 250 MG/DL (ref 74–106)
HCT VFR BLD CALC: 39 % (ref 35.4–49)
HGB BLD-MCNC: 13.4 GM/DL (ref 11.7–16.9)
KETONES UR QL STRIP: NEGATIVE
LEUKOCYTE ESTERASE UR QL STRIP.AUTO: NEGATIVE
LIPASE SERPL-CCNC: 218 U/L (ref 73–393)
LYMPHOCYTES # BLD: 17.1 % (ref 8–40)
MAGNESIUM SERPL-MCNC: 1.9 MG/DL (ref 1.8–2.4)
MCH RBC QN AUTO: 31.2 PG (ref 25.7–33.7)
MCHC RBC AUTO-ENTMCNC: 34.2 G/DL (ref 32–35.9)
MCV RBC: 91.3 FL (ref 80–96)
MONOCYTES # BLD AUTO: 7.5 % (ref 3.8–10.2)
NEUTROPHILS # BLD: 74.6 % (ref 42.8–82.8)
NITRITE UR QL STRIP: NEGATIVE
PH UR: 5 [PH] (ref 5–8)
PLATELET # BLD AUTO: 279 K/MM3 (ref 134–434)
PMV BLD: 8.4 FL (ref 7.5–11.1)
POTASSIUM SERPLBLD-SCNC: 4.3 MMOL/L (ref 3.5–5.1)
PROT SERPL-MCNC: 7.6 G/DL (ref 6.4–8.2)
PROT UR QL STRIP: (no result)
PROT UR QL STRIP: NEGATIVE
RBC # BLD AUTO: 4.28 M/MM3 (ref 4–5.6)
SODIUM SERPL-SCNC: 133 MMOL/L (ref 136–145)
SP GR UR: 1.01 (ref 1.01–1.03)
UROBILINOGEN UR STRIP-MCNC: NEGATIVE MG/DL (ref 0.2–1)
WBC # BLD AUTO: 7.3 K/MM3 (ref 4–10)

## 2019-02-28 PROCEDURE — 3E033GC INTRODUCTION OF OTHER THERAPEUTIC SUBSTANCE INTO PERIPHERAL VEIN, PERCUTANEOUS APPROACH: ICD-10-PCS

## 2019-02-28 NOTE — EKG
Test Reason : 

Blood Pressure : ***/*** mmHG

Vent. Rate : 092 BPM     Atrial Rate : 092 BPM

   P-R Int : 178 ms          QRS Dur : 086 ms

    QT Int : 318 ms       P-R-T Axes : 063 057 071 degrees

   QTc Int : 393 ms

 

NORMAL SINUS RHYTHM

NONSPECIFIC ST AND T WAVE ABNORMALITY

ABNORMAL ECG

WHEN COMPARED WITH ECG OF 29-JUL-2017 15:15,

NO SIGNIFICANT CHANGE WAS FOUND

Confirmed by DAMIAN COLMENARES, CLIFTON ([id number]) on 2/28/2019 4:51:28 PM

 

Referred By:             Confirmed By:CLIFTON SALGADO MD

## 2019-02-28 NOTE — PDOC
Attending Attestation





- Medical Decision Making


Documentation prepared by ELANA Malcolm, acting as medical scribe for 

Lilliana Wyatt MD.





02/28/19 16:18








<Gardenia Ogden - Last Filed: 02/28/19 16:18>





- Resident


Resident Name: Vicente Rao





- ED Attending Attestation


I have performed the following: I have examined & evaluated the patient, The 

case was reviewed & discussed with the resident, I agree w/resident's findings 

& plan





- HPI


HPI: 





02/28/19 16:37





HPI


83 Y M, with PMH of HTN, DM, and BPH, presenting with abdominal pain for 1 

month and chest pain for 1 day. Patient reports sharp, intermittent left-sided 

chest pain with associated dyspnea on exertion. He also complains of sharp, 

intermittent epigastric and LUQ pain that radiates to his back. Patient 

additionally reports frequent constipation. 


Denies fever, chills, palpitation, weakness, N, V, D, bladder problems, leg 

swelling, No sick contacts or travel. No new changes in medications.


Allergies: NKA


Past Medical History: HTN, DM, and BPH


Social history: Lives with family. No smoking. No alcohol. No illicit drugs.


Surgical history: None reported 











- Physicial Exam


PE: 





02/28/19 18:33





NAD, well appearing, PERRL, EOMI, MMM, nl conjunctiva, anicteric; neck supple. 

lungs clear, RRR, +reproducible chest wall TTP; abdomen soft diffuse tenderness

; no rebound or guarding. CASEY x4, no focal neuro deficits. No peripheral edema. 

normal color for ethnicity, WWP.








- Medical Decision Making





02/28/19 13:14





I, Lilliana Wyatt MD, attest that this document has been prepared under my 

direction and personally reviewed by me in its entirety.   I further attest, 

that it accurately reflects all work, treatment, procedures and medical decision

-making performed by me.  





See HPI for details


Vital signs reviewed, mild tachycardia 108 bpm. normotensive, no respiratory 

distress.


Prior notes reviewed, including admissions, discharges and consultations. 

laboratory results and imaging reviewed, basic labs and lytes wnl, normal LFTs/

lipase.


UA clear, no blood or infection





CXR_no acute chest pathology


Cardiac panel_neg trops x2, reproducible, so doubt ACS or arrythmia


EKG normal sinus rhythm at 92 bpm, no interval abnormalities, narrow QRS, ST 

and T wave segments and morphology normal. Nonspecific T wave abnormalities 





ED course: rpt VS normal


CT a/p neg for acute pathology, fat containing umbilical hernia,  no obstruction

; renal cysts. given pepcid and GI cocktail, fluids.





Pt informed of my clinical impression, treatment recommendations and 

disposition plan. All questions answered to patient's satisfaction and 

expressed understanding and comfort with this. Reasons for returning to the ED 

sooner discussed with the patient otherwise, follow up with primary care 

physician. At the time of discharge, the patient is alert, clinically improved, 

tolerating po and verbalizes understanding of instructions. Patient does not 

suffer from an acute life-threatening medical condition at this time he is safe 

for outpatient follow-up. 








02/28/19 18:35








<Lilliana Wyatt - Last Filed: 02/28/19 18:38>





**Heart Score/ECG Review


  ** #1


ECG reviewed & interpreted by me at: 13:00


General ECG Interpretation: Sinus Rhythm





02/28/19 13:14





EKG normal sinus rhythm at 92 bpm, no interval abnormalities, narrow QRS, ST 

and T wave segments and morphology normal. Nonspecific T wave abnormalities 











<Lilliana Wyatt - Last Filed: 02/28/19 18:38>

## 2019-02-28 NOTE — PDOC
History of Present Illness





- General


Chief Complaint: Respiratory


Stated Complaint: SOB


Time Seen by Provider: 02/28/19 12:33


History Source: Patient, Family


Exam Limitations: No Limitations





- History of Present Illness


Initial Comments: 





02/28/19 13:00


The patient is an 83M with a PMH of HTN, DM, BPH who presents to the ER with 

complaints of chest pain and abdominal pain. The patient states that he 

developed sudden onset, intermittent, nonradiating chest pain this morning. The 

pain is not associated with trauma, exertion, or movements. The pain is 

reproducible when the patient pushes on his chest. The patient also states that 

he develops shortness of breath with the chest pain and also gets short of 

breath when exerting himself. He denies nausea, vomiting, diaphoresis, numbness

, tingling or weakness, fever, chills. He also admits to 1 month of 

intermittent epigastric and LUQ abdominal pressure, which radiates to his back. 

He denies nausea and vomiting with this pain but admits to frequently being 

constipated. 





Past History





- Past Medical History


Allergies/Adverse Reactions: 


 Allergies











Allergy/AdvReac Type Severity Reaction Status Date / Time


 


No Known Drug Allergies Allergy   Verified 02/28/19 12:28











Home Medications: 


Ambulatory Orders





Metformin HCl [Glucophage] 1,000 mg PO DAILY 02/17/15 


Tamsulosin HCl 0.4 mg PO DAILY 02/17/15 


Metoprolol Succinate [Toprol XL -] 50 mg PO DAILY  tab 07/31/17 








Anemia: No


Asthma: No


Cancer: No


Cardiac Disorders: No


CVA: No


COPD: No


CHF: No


Dementia: No


Diabetes: Yes


GI Disorders: Yes (REFLUX)


 Disorders: No


HTN: Yes


Hypercholesterolemia: No


Liver Disease: No


Seizures: No


Thyroid Disease: No





- Surgical History


Abdominal Surgery: No


Appendectomy: No


Cardiac Surgery: No


Cholecystectomy: No


Lung Surgery: No


Orthopedic Surgery: No





- Suicide/Smoking/Psychosocial Hx


Smoking History: Smoker current status UNK


Have you smoked in the past 12 months: No


Hx Alcohol Use: No


Drug/Substance Use Hx: No


Substance Use Type: None


Hx Substance Use Treatment: No





**Review of Systems





- Review of Systems


Able to Perform ROS?: Yes


Comments:: 





02/28/19 13:39


GENERAL/CONSTITUTIONAL: No fever or chills. No weakness.


HEAD, EYES, EARS, NOSE AND THROAT: No change in vision. No ear pain or 

discharge. No sore throat.


CARDIOVASCULAR: Positive for CP. No palpitations or lightheadedness.


RESPIRATORY: Positive for SOB. No cough, wheezing, or hemoptysis.


GASTROINTESTINAL: Positive for abdominal pain and constipation. No nausea, 

vomiting, or diarrhea. 


GENITOURINARY: No dysuria, frequency, hematuria, or change in urination.


MUSCULOSKELETAL: No joint or muscle swelling or pain. No neck or back pain.


SKIN: No rash or lesions. 


NEUROLOGIC: No headache, numbness, tingling, focal weakness, loss of 

consciousness, or change in strength/sensation.





Is the patient limited English proficient: No





*Physical Exam





- Vital Signs


 Last Vital Signs











Temp Pulse Resp BP Pulse Ox


 


 98.1 F   108 H  20   163/65   99 


 


 02/28/19 12:30  02/28/19 12:30  02/28/19 12:30  02/28/19 12:30  02/28/19 12:30














- Physical Exam


Comments: 





02/28/19 13:40


GENERAL: Well developed, well nourished. Awake and alert. No acute distress.


HEENT: Normocephalic, atraumatic. Hearing grossly normal. Moist mucous 

membranes. PERRLA, EOMI. No conjunctival pallor. Sclera are non-icteric. 


NECK: Supple. Full ROM. No JVD. 


CARDIOVASCULAR: Regular rate and rhythm. No murmurs, rubs, or gallops. TTP over 

L anterior/inferior ribs.


PULMONARY: No evidence of respiratory distress. Lungs clear to auscultation 

bilaterally. No wheezing, rales or rhonchi.


ABDOMINAL: Soft. TTP over epigastrium and LUQ. Negative Christine's. Non-

distended. No rebound or guarding.


GENITOURINARY: R CVA tenderness.


MUSCULOSKELETAL: Normal range of motion at all joints. No bony deformities or 

tenderness. 


EXTREMITIES: No cyanosis. No clubbing. No edema. No calf tenderness or swelling.


SKIN: Warm and dry. Normal capillary refill. No rashes. No jaundice. 


NEUROLOGICAL: Alert, awake, appropriate. Cranial nerves 2-12 grossly intact. 

Normal speech. Gait is normal without ataxia.


PSYCHIATRIC: Cooperative. Good eye contact. Appropriate mood and affect.








Moderate Sedation





- Procedure Monitoring


Vital Signs: 


Procedure Monitoring Vital Signs











Temperature  98.1 F   02/28/19 12:30


 


Pulse Rate  108 H  02/28/19 12:30


 


Respiratory Rate  20   02/28/19 12:30


 


Blood Pressure  163/65   02/28/19 12:30


 


O2 Sat by Pulse Oximetry (%)  99   02/28/19 12:30











**Heart Score/ECG Review





- History


History: Slightly suspicious





- Electrocardiogram


EKG: Normal





- Age


Age: >/= 65





- Risk Factors


Risk Factors Heart Score: Yes Hx Hypertension, Yes Hx Diabetes


Based on the list above the patient has:: 1-2 risk factors





- Troponin


Troponin: </= normal limit





- Score


Heart Score - Total: 3


  ** #1


ECG reviewed & interpreted by me at: 13:41


General ECG Interpretation: Sinus Rhythm, Normal Rate, Normal Intervals, No 

acute ischemic changes


Compared to previous ECG there are: No significant change





02/28/19 13:44


NSR vent rate 90





QRS 86


QTc 393





No STD or KRISTAN


No signs of acute ischemia





ED Treatment Course





- LABORATORY


CBC & Chemistry Diagram: 


 02/28/19 12:54





 02/28/19 12:54





- RADIOLOGY


Radiology Studies Ordered: 














 Category Date Time Status


 


 ABDOMEN & PELVIS CT WITH CONTR [CT] Stat CT Scan  02/28/19 12:55 Ordered


 


 CHEST PA & LAT [RAD] Stat Radiology  02/28/19 12:55 Ordered














Medical Decision Making





- Medical Decision Making





02/28/19 13:45


The patient is an 83M with a PMH of HTN, DM, BPH who presents with 1 day of CP 

that is atypical as it is reproducible. He admits to SOB with the CP making it 

concerning for ACS. Pt also has abdominal tenderness. Will order labs and give 

fluids/pepcid. Pending labs, XR, and CTAP. Pt is well appearing otherwise with 

stable vitals. EKG unremarkable.





02/28/19 14:38


CBC, CMP, troponin, UA negative. CXR negative on preliminary read. Pending CTAP.





02/28/19 15:49


HEART score of 3. Will repeat trop and d/c home if negative.





02/28/19 17:28


Troponin negative x 2. Pending CTAP. 





Will d/c with PCP f/u.





*DC/Admit/Observation/Transfer


Diagnosis at time of Disposition: 


 Chest pain, atypical








- Discharge Dispostion


Disposition: HOME


Condition at time of disposition: Stable


Decision to Admit order: No





- Referrals





- Patient Instructions


Printed Discharge Instructions:  DI for Atypical Chest Pain, DI for Constipation


Additional Instructions: 


Please follow up with your primary care physician in 2-3 days. 





Please return to the ER if you have any signs or symptoms of chest pain, 

shortness of breath, uncontrollable fever, chills, nausea, vomiting, numbness, 

tingling, or weakness in any part of your body, changes in vision, or slurred 

speech.





Please take your medications as prescribed.





Please return to the ER if symptoms persist, worsen, or new symptoms arise.





Por favor zain un seguimiento con samuel mdico de atencin primaria en 2-3 eli.





Regrese a la hellen de emergencias si tiene signos o sntomas de dolor en el pecho

, dificultad para respirar, fiebre incontrolable, escalofros, nuseas, vmitos

, entumecimiento, hormigueo o debilidad en alguna parte de samuel cuerpo, cambios 

en la visin o dificultad para hablar.





Por favor, tome buzz medicamentos segn lo prescrito.





Regrese a la hellen de emergencias si los sntomas persisten, empeoran o surgen 

nuevos sntomas.


Print Language: Georgian





- Post Discharge Activity